# Patient Record
Sex: MALE | Race: WHITE | URBAN - METROPOLITAN AREA
[De-identification: names, ages, dates, MRNs, and addresses within clinical notes are randomized per-mention and may not be internally consistent; named-entity substitution may affect disease eponyms.]

---

## 2017-04-27 ENCOUNTER — OFFICE VISIT (OUTPATIENT)
Dept: FAMILY MEDICINE | Facility: CLINIC | Age: 32
End: 2017-04-27
Payer: COMMERCIAL

## 2017-04-27 VITALS
HEIGHT: 69 IN | SYSTOLIC BLOOD PRESSURE: 139 MMHG | HEART RATE: 70 BPM | BODY MASS INDEX: 28.58 KG/M2 | OXYGEN SATURATION: 96 % | TEMPERATURE: 97.6 F | DIASTOLIC BLOOD PRESSURE: 86 MMHG | WEIGHT: 193 LBS

## 2017-04-27 DIAGNOSIS — F33.41 RECURRENT MAJOR DEPRESSION IN PARTIAL REMISSION (H): ICD-10-CM

## 2017-04-27 DIAGNOSIS — F41.9 ANXIETY: ICD-10-CM

## 2017-04-27 DIAGNOSIS — Z00.00 ROUTINE HISTORY AND PHYSICAL EXAMINATION OF ADULT: Primary | ICD-10-CM

## 2017-04-27 DIAGNOSIS — Z72.0 TOBACCO ABUSE: ICD-10-CM

## 2017-04-27 PROCEDURE — 99395 PREV VISIT EST AGE 18-39: CPT | Performed by: FAMILY MEDICINE

## 2017-04-27 RX ORDER — LORAZEPAM 0.5 MG/1
0.5 TABLET ORAL EVERY 6 HOURS PRN
Qty: 25 TABLET | Refills: 0 | Status: SHIPPED | OUTPATIENT
Start: 2017-04-27

## 2017-04-27 RX ORDER — ESCITALOPRAM OXALATE 20 MG/1
20 TABLET ORAL DAILY
Qty: 90 TABLET | Refills: 11 | Status: SHIPPED | OUTPATIENT
Start: 2017-04-27

## 2017-04-27 RX ORDER — POLYETHYLENE GLYCOL 3350 17 G
2 POWDER IN PACKET (EA) ORAL
Qty: 144 LOZENGE | Refills: 3 | Status: SHIPPED | OUTPATIENT
Start: 2017-04-27

## 2017-04-27 NOTE — PROGRESS NOTES
SUBJECTIVE:     CC: Nelson Ann is an 32 year old male who presents for preventative health visit.     Healthy Habits:    Do you get at least three servings of calcium containing foods daily (dairy, green leafy vegetables, etc.)? yes    Amount of exercise or daily activities, outside of work: 3 day(s) per week biking    Problems taking medications regularly No    Medication side effects: No    Have you had an eye exam in the past two years? no    Do you see a dentist twice per year? yes    Do you have sleep apnea, excessive snoring or daytime drowsiness?no        Today's PHQ-2 Score:   PHQ-2 ( 1999 [employer]) 12/4/2014   Q1: Little interest or pleasure in doing things 0   Q2: Feeling down, depressed or hopeless 0   PHQ-2 Score 0       Abuse: Current or Past(Physical, Sexual or Emotional)- No  Do you feel safe in your environment - Yes    Social History   Substance Use Topics     Smoking status: Current Every Day Smoker     Packs/day: 0.50     Years: 10.00     Types: Cigarettes     Smokeless tobacco: Never Used     Alcohol use 0.0 oz/week     0 Standard drinks or equivalent per week      Comment: 2/day, 10-14/week     The patient does not drink >3 drinks per day nor >7 drinks per week.    Last PSA: No results found for: PSA    No results for input(s): CHOL, HDL, LDL, TRIG, CHOLHDLRATIO, NHDL in the last 77237 hours.    Reviewed orders with patient. Reviewed health maintenance and updated orders accordingly - Yes    Reviewed and updated as needed this visit by clinical staff         Reviewed and updated as needed this visit by Provider          STD screening:  History   Sexual Activity     Sexual activity: Yes     Partners: Male      screening desired today  Last red door screening < 6 months ago    in addition to health maintanance patient needs routine follow-up of his anxiety and depression medication  Previous treatment modalities employed include Celexa only occasional Ativan used one or 2 times a  month    Discussed potential for impairment and dependence with benzo medications, a limited Rx prescribed    No known medical causes of depression or Anxiety.        ROS: As per HPI.  Constitutional: no recent illness, no fevers/sweats/chills, sleep normal  Eyes: No vision changes or eye irritation  Ears/Nose/Throat: No runny nose, sore throat or ear pain  CV: no palpitations, no chest pain, no lower extremity swelling.  Resp: no shortness of breath, wheezing, or cough.  GI: no nausea/vomiting/diarrhea, normal stooling pattern, no reflux symptoms, no black or bloody stools  : no burning or urgency with urination, no blood in urine, no sores or discharge.  Skin: no changing moles or other lesions, no rash  Musculoskeletal: no joint pain, muscle pain, weakness, trauma or injury  Psych: no depression, no concerns about anxiety  Neuro: no new headaches, dizziness    I have reviewed and updated the patient's past medical, social and family history in the EMR. Current problems are:  Patient Active Problem List    Diagnosis Date Noted     CARDIOVASCULAR SCREENING; LDL GOAL LESS THAN 160 03/09/2014     Priority: Medium     Recurrent major depression in partial remission (HCC)      Priority: Medium     hs saw psych-wellbutrin, worse sx in college       Anxiety      Priority: Medium     MRSA (methicillin resistant staph aureus) culture positive      Priority: Medium     Family Hx:  Family History   Problem Relation Age of Onset     Thyroid Disease Mother      HEART DISEASE Father      DIABETES Father      Social History:  Social History   Substance Use Topics     Smoking status: Current Some Day Smoker     Packs/day: 0.50     Years: 10.00     Types: Cigarettes     Smokeless tobacco: Never Used     Alcohol use 0.0 oz/week     0 Standard drinks or equivalent per week      Comment: 2/day, 10-14/week     Social History     Social History Narrative     Allergies:     Allergies   Allergen Reactions     Seasonal Allergies   "     Current Medications:  Current Outpatient Prescriptions   Medication Sig Dispense Refill     escitalopram (LEXAPRO) 20 MG tablet Take 1 tablet (20 mg) by mouth daily 90 tablet 1     LORazepam (ATIVAN) 0.5 MG tablet Take 1 tablet (0.5 mg) by mouth every 6 hours as needed 25 tablet 0        Objective:  /86  Pulse 70  Temp 97.6  F (36.4  C) (Oral)  Ht 5' 8.75\" (1.746 m)  Wt 193 lb (87.5 kg)  SpO2 96%  BMI 28.71 kg/m2  General Appearance: Pleasant, alert, WN/WD in no acute respiratory distress.  Head Exam: Normal. Normocephalic, atraumatic. No sinus tenderness.  Eye Exam: Normal external eye, conjunctiva, lids. WONG.  Ear Exam: Normal auditory canals and external ears. Non-tender.  Left TM-normal. Right TM-normal.  OroPharynx Exam: Dental hygiene adequate. Normal buccal mucosa. Normal pharynx.  Neck Exam: Supple, no masses or enlarged, tender nodes.  Thyroid Exam: No nodules or enlargement or tenderness.  Chest/Respiratory Exam: Normal, comfortable, easy respirations. Chest wall normal. Lungs are clear to auscultation. No wheezing, crackles, or rhonchi.  Cardiovascular Exam: Regular rate and rhythm. No murmur, gallop, or rubs. No pedal edema.  Gastrointestinal Exam: Soft, non-tender, no masses or organomegaly.  Musculoskeletal Exam: Back is non-tender, full ROM of upper and lower extremities.  Skin: no rash, warm and dry.  Neurologic Exam: Nonfocal, no tremor. Normal gait.  Psychiatric Exam: Alert - appropriate, normal affect    ASSESSMENT/PLAN:    ICD-10-CM    1. Routine history and physical examination of adult Z00.00    2. Recurrent major depression in partial remission (H) F33.41 escitalopram (LEXAPRO) 20 MG tablet     LORazepam (ATIVAN) 0.5 MG tablet   3. Anxiety F41.9 escitalopram (LEXAPRO) 20 MG tablet     LORazepam (ATIVAN) 0.5 MG tablet   4. Recurrent major depression in partial remission (HCC) F33.41 escitalopram (LEXAPRO) 20 MG tablet   5. Tobacco abuse Z72.0        COUNSELING:  Reviewed " "preventive health counseling, as reflected in patient instructions       Regular exercise       Healthy diet/nutrition    BP Screening:   Last 3 BP Readings:    BP Readings from Last 3 Encounters:   04/27/17 139/86   03/24/16 124/72   08/24/15 138/70       The following was recommended to the patient:  Re-screen BP within a year and recommended lifestyle modifications     reports that he has been smoking Cigarettes.  He has a 5.00 pack-year smoking history. He has never used smokeless tobacco.  Tobacco Cessation Action Plan: Pharmacotherapies : other Nicotine replacement  Self help information given to patient  Estimated body mass index is 28.32 kg/(m^2) as calculated from the following:    Height as of 3/24/16: 5' 8.75\" (1.746 m).    Weight as of 3/24/16: 190 lb 6.4 oz (86.4 kg).   Weight management plan: Discussed healthy diet and exercise guidelines and patient will follow up in 12 months in clinic to re-evaluate.    Counseling Resources:  ATP IV Guidelines  Pooled Cohorts Equation Calculator  FRAX Risk Assessment  ICSI Preventive Guidelines  Dietary Guidelines for Americans, 2010  USDA's MyPlate  ASA Prophylaxis  Lung CA Screening    Tyrone Calix MD  [facility]  "

## 2017-04-27 NOTE — MR AVS SNAPSHOT
After Visit Summary   4/27/2017    Nelson Ann    MRN: [id number]           Patient Information     Date Of Birth          1985        Visit Information        Provider Department      4/27/2017 11:30 AM Tyrone Calix MD [facility]        Today's Diagnoses     Routine history and physical examination of adult    -  1    Recurrent major depression in partial remission (H)        Anxiety        Tobacco abuse          Care Instructions      Preventive Health Recommendations  Male Ages 26 - 39    Yearly exam:             See your health care provider every year in order to  o   Review health changes.   o   Discuss preventive care.    o   Review your medicines if your doctor has prescribed any.    You should be tested each year for STDs (sexually transmitted diseases), if you re at risk.     After age 35, talk to your provider about cholesterol testing. If you are at risk for heart disease, have your cholesterol tested at least every 5 years.     If you are at risk for diabetes, you should have a diabetes test (fasting glucose).  Shots: Get a flu shot each year. Get a tetanus shot every 10 years.     Nutrition:    Eat at least 5 servings of fruits and vegetables daily.     Eat whole-grain bread, whole-wheat pasta and brown rice instead of white grains and rice.     Talk to your provider about Calcium and Vitamin D.     Lifestyle    Exercise for at least 150 minutes a week (30 minutes a day, 5 days a week). This will help you control your weight and prevent disease.     Limit alcohol to one drink per day.     No smoking.     Wear sunscreen to prevent skin cancer.     See your dentist every six months for an exam and cleaning.           Follow-ups after your visit        Follow-up notes from your care team     Return in about 1 year (around 4/27/2018), or if symptoms worsen or fail to improve.      Who to contact     If you have questions or need follow up information about  "today's clinic visit or your schedule please contact [facility] directly at [phone].  Normal or non-critical lab and imaging results will be communicated to you by [employer], letter or phone within 4 business days after the clinic has received the results. If you do not hear from us within 7 days, please contact the clinic through [employer] or phone. If you have a critical or abnormal lab result, we will notify you by phone as soon as possible.  Submit refill requests through [facility] or call your pharmacy and they will forward the refill request to us. Please allow 3 business days for your refill to be completed.          Additional Information About Your Visit        [employer] Information     [facility] gives you secure access to your electronic health record. If you see a primary care provider, you can also send messages to your care team and make appointments. If you have questions, please call your primary care clinic.  If you do not have a primary care provider, please call [phone] and they will assist you.        Care EveryWhere ID     This is your Care EveryWhere ID. This could be used by other organizations to access your [facility] medical records  [id number]        Your Vitals Were     Pulse Temperature Height Pulse Oximetry BMI (Body Mass Index)       70 97.6  F (36.4  C) (Oral) 5' 8.75\" (1.746 m) 96% 28.71 kg/m2        Blood Pressure from Last 3 Encounters:   04/27/17 139/86   03/24/16 124/72   08/24/15 138/70    Weight from Last 3 Encounters:   04/27/17 193 lb (87.5 kg)   03/24/16 190 lb 6.4 oz (86.4 kg)   08/24/15 187 lb (84.8 kg)              Today, you had the following     No orders found for display         Today's Medication Changes          These changes are accurate as of: 4/27/17  3:09 PM.  If you have any questions, ask your nurse or doctor.               Start taking these medicines.        Dose/Directions    nicotine polacrilex 2 MG lozenge   Commonly known as:  EQ NICOTINE " POLACRILEX   Used for:  Tobacco abuse   Started by:  Tyrone Calix MD        Dose:  2 mg   Place 1 lozenge (2 mg) inside cheek every hour as needed for smoking cessation   Quantity:  144 lozenge   Refills:  3            Where to get your medicines      These medications were sent to [facility] [id number] [facility] - [place], [place] - [address] [facility]  [address], [place] [address]     Phone:  [phone]     escitalopram 20 MG tablet    nicotine polacrilex 2 MG lozenge         Some of these will need a paper prescription and others can be bought over the counter.  Ask your nurse if you have questions.     Bring a paper prescription for each of these medications     LORazepam 0.5 MG tablet                Primary Care Provider Office Phone # Fax #    Tyrone Calix [phone]       [facility] [address] [address]        Thank you!     Thank you for choosing [facility]  for your care. Our goal is always to provide you with excellent care. Hearing back from our patients is one way we can continue to improve our services. Please take a few minutes to complete the written survey that you may receive in the mail after your visit with us. Thank you!             Your Updated Medication List - Protect others around you: Learn how to safely use, store and throw away your medicines at www.disposemymeds.org.          This list is accurate as of: 4/27/17  3:09 PM.  Always use your most recent med list.                   Brand Name Dispense Instructions for use    escitalopram 20 MG tablet    LEXAPRO    90 tablet    Take 1 tablet (20 mg) by mouth daily       LORazepam 0.5 MG tablet    ATIVAN    25 tablet    Take 1 tablet (0.5 mg) by mouth every 6 hours as needed       nicotine polacrilex 2 MG lozenge    EQ NICOTINE POLACRILEX    144 lozenge    Place 1 lozenge (2 mg) inside cheek every hour as needed for smoking cessation

## 2017-04-27 NOTE — NURSING NOTE
"Chief Complaint   Patient presents with     Physical     not fasting     /86  Pulse 70  Temp 97.6  F (36.4  C) (Oral)  Ht 5' 8.75\" (1.746 m)  Wt 193 lb (87.5 kg)  SpO2 96%  BMI 28.71 kg/m2 Estimated body mass index is 28.71 kg/(m^2) as calculated from the following:    Height as of this encounter: 5' 8.75\" (1.746 m).    Weight as of this encounter: 193 lb (87.5 kg).  BP completed using cuff size: large       Health Maintenance due pending provider review:  PHQ9    PHQ/ACT/SORIN--Gave pt questionnare      Hannah Castillo CMA  "

## 2017-04-28 ASSESSMENT — PATIENT HEALTH QUESTIONNAIRE - PHQ9: SUM OF ALL RESPONSES TO PHQ QUESTIONS 1-9: 9

## 2017-05-02 ENCOUNTER — TELEPHONE (OUTPATIENT)
Dept: FAMILY MEDICINE | Facility: CLINIC | Age: 32
End: 2017-05-02

## 2017-05-02 NOTE — TELEPHONE ENCOUNTER
[employer] approved Nicotine 2mg Lozenge through next year.  Ph# [phone]  ID# [id number]  [facility] [address] ph# [phone]

## 2020-03-02 ENCOUNTER — HEALTH MAINTENANCE LETTER (OUTPATIENT)
Age: 35
End: 2020-03-02

## 2020-12-20 ENCOUNTER — HEALTH MAINTENANCE LETTER (OUTPATIENT)
Age: 35
End: 2020-12-20

## 2021-04-24 ENCOUNTER — HEALTH MAINTENANCE LETTER (OUTPATIENT)
Age: 36
End: 2021-04-24

## 2021-10-03 ENCOUNTER — HEALTH MAINTENANCE LETTER (OUTPATIENT)
Age: 36
End: 2021-10-03

## 2022-05-15 ENCOUNTER — HEALTH MAINTENANCE LETTER (OUTPATIENT)
Age: 37
End: 2022-05-15

## 2022-09-10 ENCOUNTER — HEALTH MAINTENANCE LETTER (OUTPATIENT)
Age: 37
End: 2022-09-10

## 2023-06-03 ENCOUNTER — HEALTH MAINTENANCE LETTER (OUTPATIENT)
Age: 38
End: 2023-06-03

## 2025-06-04 ENCOUNTER — APPOINTMENT (OUTPATIENT)
Dept: URBAN - METROPOLITAN AREA CLINIC 256 | Age: 40
Setting detail: DERMATOLOGY
End: 2025-06-05

## 2025-06-04 VITALS — HEIGHT: 69 IN | WEIGHT: 183 LBS

## 2025-06-04 DIAGNOSIS — Z79.899 OTHER LONG TERM (CURRENT) DRUG THERAPY: ICD-10-CM

## 2025-06-04 DIAGNOSIS — L40.0 PSORIASIS VULGARIS: ICD-10-CM

## 2025-06-04 PROCEDURE — OTHER MIPS QUALITY: OTHER

## 2025-06-04 PROCEDURE — OTHER ORDER TESTS: OTHER

## 2025-06-04 PROCEDURE — OTHER SKYRIZI INJECTION: OTHER

## 2025-06-04 PROCEDURE — OTHER HIGH RISK MEDICATION MONITORING: OTHER

## 2025-06-04 PROCEDURE — OTHER PRESCRIPTION: OTHER

## 2025-06-04 PROCEDURE — OTHER VENIPUNCTURE: OTHER

## 2025-06-04 PROCEDURE — OTHER SKYRIZI INITIATION: OTHER

## 2025-06-04 PROCEDURE — OTHER PRESCRIPTION MEDICATION MANAGEMENT: OTHER

## 2025-06-04 RX ORDER — RISANKIZUMAB-RZAA 150 MG/ML
INJECTION SUBCUTANEOUS
Qty: 1 | Refills: 2 | Status: ERX | COMMUNITY
Start: 2025-06-04

## 2025-06-04 ASSESSMENT — ITCH NUMERIC RATING SCALE: HOW SEVERE IS YOUR ITCHING?: 0

## 2025-06-04 ASSESSMENT — BSA PSORIASIS: % BODY COVERED IN PSORIASIS: 30

## 2025-06-04 ASSESSMENT — PGA PSORIASIS: PGA PSORIASIS 2020: MODERATE

## 2025-06-04 ASSESSMENT — LOCATION SIMPLE DESCRIPTION DERM: LOCATION SIMPLE: LEFT THIGH

## 2025-06-04 ASSESSMENT — LOCATION ZONE DERM: LOCATION ZONE: LEG

## 2025-06-04 ASSESSMENT — LOCATION DETAILED DESCRIPTION DERM: LOCATION DETAILED: LEFT ANTERIOR DISTAL THIGH

## 2025-06-18 RX ORDER — RISANKIZUMAB-RZAA 150 MG/ML
INJECTION SUBCUTANEOUS
Qty: 1 | Refills: 2 | Status: CANCELLED
Stop reason: CLARIF

## 2025-06-18 RX ORDER — RISANKIZUMAB-RZAA 150 MG/ML
INJECTION SUBCUTANEOUS
Qty: 1 | Refills: 2 | Status: ERX

## 2025-07-02 ENCOUNTER — APPOINTMENT (OUTPATIENT)
Dept: URBAN - METROPOLITAN AREA CLINIC 256 | Age: 40
Setting detail: DERMATOLOGY
End: 2025-07-02

## 2025-07-02 VITALS — HEIGHT: 69 IN | WEIGHT: 180 LBS

## 2025-07-02 DIAGNOSIS — Z79.899 OTHER LONG TERM (CURRENT) DRUG THERAPY: ICD-10-CM

## 2025-07-02 DIAGNOSIS — L40.0 PSORIASIS VULGARIS: ICD-10-CM

## 2025-07-02 PROCEDURE — OTHER MIPS QUALITY: OTHER

## 2025-07-02 PROCEDURE — OTHER COUNSELING: OTHER

## 2025-07-02 PROCEDURE — OTHER PRESCRIPTION MEDICATION MANAGEMENT: OTHER

## 2025-07-02 PROCEDURE — OTHER SKYRIZI MONITORING: OTHER

## 2025-07-02 PROCEDURE — OTHER PRESCRIPTION: OTHER

## 2025-07-02 PROCEDURE — 99214 OFFICE O/P EST MOD 30 MIN: CPT

## 2025-07-02 PROCEDURE — OTHER HIGH RISK MEDICATION MONITORING: OTHER

## 2025-07-02 RX ORDER — RISANKIZUMAB-RZAA 150 MG/ML
INJECTION SUBCUTANEOUS
Qty: 1 | Refills: 2 | Status: ERX

## 2025-07-02 ASSESSMENT — LOCATION DETAILED DESCRIPTION DERM
LOCATION DETAILED: LEFT ELBOW
LOCATION DETAILED: LEFT PROXIMAL PRETIBIAL REGION
LOCATION DETAILED: RIGHT PROXIMAL PRETIBIAL REGION
LOCATION DETAILED: PERIUMBILICAL SKIN
LOCATION DETAILED: RIGHT ELBOW

## 2025-07-02 ASSESSMENT — ITCH NUMERIC RATING SCALE: HOW SEVERE IS YOUR ITCHING?: 1

## 2025-07-02 ASSESSMENT — LOCATION ZONE DERM
LOCATION ZONE: LEG
LOCATION ZONE: TRUNK
LOCATION ZONE: ARM

## 2025-07-02 ASSESSMENT — BSA PSORIASIS: % BODY COVERED IN PSORIASIS: 20

## 2025-07-02 ASSESSMENT — LOCATION SIMPLE DESCRIPTION DERM
LOCATION SIMPLE: ABDOMEN
LOCATION SIMPLE: LEFT PRETIBIAL REGION
LOCATION SIMPLE: LEFT ELBOW
LOCATION SIMPLE: RIGHT ELBOW
LOCATION SIMPLE: RIGHT PRETIBIAL REGION

## 2025-07-02 ASSESSMENT — PGA PSORIASIS: PGA PSORIASIS 2020: MILD
